# Patient Record
Sex: FEMALE | Race: WHITE | NOT HISPANIC OR LATINO | URBAN - METROPOLITAN AREA
[De-identification: names, ages, dates, MRNs, and addresses within clinical notes are randomized per-mention and may not be internally consistent; named-entity substitution may affect disease eponyms.]

---

## 2018-09-08 ENCOUNTER — EMERGENCY (EMERGENCY)
Facility: HOSPITAL | Age: 48
LOS: 1 days | End: 2018-09-08
Payer: COMMERCIAL

## 2018-09-08 PROCEDURE — 99284 EMERGENCY DEPT VISIT MOD MDM: CPT

## 2018-09-08 PROCEDURE — 71046 X-RAY EXAM CHEST 2 VIEWS: CPT | Mod: 26

## 2020-05-28 PROBLEM — Z00.00 ENCOUNTER FOR PREVENTIVE HEALTH EXAMINATION: Status: ACTIVE | Noted: 2020-05-28

## 2020-05-29 ENCOUNTER — APPOINTMENT (OUTPATIENT)
Dept: ULTRASOUND IMAGING | Facility: CLINIC | Age: 50
End: 2020-05-29
Payer: COMMERCIAL

## 2020-05-29 PROCEDURE — 76700 US EXAM ABDOM COMPLETE: CPT

## 2020-06-03 ENCOUNTER — APPOINTMENT (OUTPATIENT)
Dept: DISASTER EMERGENCY | Facility: CLINIC | Age: 50
End: 2020-06-03

## 2020-06-03 DIAGNOSIS — Z01.818 ENCOUNTER FOR OTHER PREPROCEDURAL EXAMINATION: ICD-10-CM

## 2020-06-04 LAB — SARS-COV-2 N GENE NPH QL NAA+PROBE: NOT DETECTED

## 2020-06-05 ENCOUNTER — OUTPATIENT (OUTPATIENT)
Dept: OUTPATIENT SERVICES | Facility: HOSPITAL | Age: 50
LOS: 1 days | End: 2020-06-05

## 2020-07-22 ENCOUNTER — APPOINTMENT (OUTPATIENT)
Dept: MAMMOGRAPHY | Facility: CLINIC | Age: 50
End: 2020-07-22
Payer: COMMERCIAL

## 2020-07-22 ENCOUNTER — APPOINTMENT (OUTPATIENT)
Dept: ULTRASOUND IMAGING | Facility: CLINIC | Age: 50
End: 2020-07-22
Payer: COMMERCIAL

## 2020-07-22 PROCEDURE — 77063 BREAST TOMOSYNTHESIS BI: CPT

## 2020-07-22 PROCEDURE — 76536 US EXAM OF HEAD AND NECK: CPT

## 2020-07-22 PROCEDURE — 77067 SCR MAMMO BI INCL CAD: CPT

## 2020-07-28 ENCOUNTER — APPOINTMENT (OUTPATIENT)
Dept: ENDOCRINOLOGY | Facility: CLINIC | Age: 50
End: 2020-07-28
Payer: COMMERCIAL

## 2020-07-28 VITALS
HEIGHT: 62 IN | WEIGHT: 137 LBS | HEART RATE: 76 BPM | OXYGEN SATURATION: 98 % | DIASTOLIC BLOOD PRESSURE: 86 MMHG | SYSTOLIC BLOOD PRESSURE: 142 MMHG | BODY MASS INDEX: 25.21 KG/M2

## 2020-07-28 DIAGNOSIS — E04.1 NONTOXIC SINGLE THYROID NODULE: ICD-10-CM

## 2020-07-28 DIAGNOSIS — Z78.9 OTHER SPECIFIED HEALTH STATUS: ICD-10-CM

## 2020-07-28 DIAGNOSIS — Z80.9 FAMILY HISTORY OF MALIGNANT NEOPLASM, UNSPECIFIED: ICD-10-CM

## 2020-07-28 PROCEDURE — 99205 OFFICE O/P NEW HI 60 MIN: CPT

## 2020-07-28 RX ORDER — MULTIVIT-MIN/FOLIC/VIT K/LYCOP 400-300MCG
240 (27 FE) TABLET ORAL
Refills: 0 | Status: ACTIVE | COMMUNITY

## 2020-07-28 NOTE — HISTORY OF PRESENT ILLNESS
[FreeTextEntry1] : Nevin Rosales is a 49 yr old female, who I was asked to see in consultation for evaluation  of thyroid nodule.\par Per patient she has known to have thyroid nodule for more than 10 years, had FNA of right sided nodule in 2010, came back as benign ,consistent with goiter.\par She was then being followed by Dr. Brewer, had ultrasound in [facility] , last one in 2017, we reviewed images on the machine, right sided nodule was about 3.2 cm then.\par \par Then she says she recently had another ultrasound , shows spongiform nodule right side about 3.8 cm.\par No family h/o thyroid disorder or cancer.no h/o neck radiation.\par Intentional weight loss of 13 pounds.\par  No dysphagia, no SOB.Denies heat or cold intolerance,  no constipation or diarrhea, no palpitations, energy level fair, no skin or hair changes.\par \par

## 2020-07-28 NOTE — ASSESSMENT
[FreeTextEntry1] : Nevin Hubbard is a 49 yr old female, who I was asked to see in consultation for evaluation  of thyroid nodule.\par Per patient she has known to have thyroid nodule for more than 10 years, had FNA of right sided nodule in 2010, came back as benign ,consistent with goiter.\par She was then being followed by Dr. Brewer, had ultrasound in [facility] [address] , last one in 2017, we reviewed images on the machine, right sided nodule was about 3.2 cm then.\par Then she says she recently had another ultrasound , shows spongiform nodule right side about 3.8 cm.\par I reviewed images from the previous ultrasound done in 2017 and then recently done on 7/22/2020.\par The right sided nodule has shown some small growth over 3 years, but previous biopsy was benign, and looks like spongiform nodule which are not concerning, so discussed with patient that though it does not look concerning but the size is now getting bigger and if continues to grow we might do another biopsy,. she voiced understanding.\par I have discussed with the patient the incidence of thyroid nodules (fairly high) and the incidence of thyroid cancer (fairly low). We also discussed what can be the worrisome features of malignant nodule and increased incidence of thyroid cancer in patients >60 years old. \par We also  discussed with patient that per [facility]  guidelines  FNA is not recommended in sub centimeter nodules in low risk patient like himself risk of malignancy is very low. Only 1 right sided nodule iks 3.8 cm , another one is small.\par So at this point will repeat ultrasound in 6 month , incase of further growth will repeat FNA then.\par \par RTC in 6 months with ultrasound and thyroid blood work.

## 2020-07-28 NOTE — PHYSICAL EXAM
[Alert] : alert [Well Nourished] : well nourished [No Acute Distress] : no acute distress [Normal Sclera/Conjunctiva] : normal sclera/conjunctiva [No Proptosis] : no proptosis [Supple] : the neck was supple [No Lid Lag] : no lid lag [No Respiratory Distress] : no respiratory distress [Thyroid Not Enlarged] : the thyroid was not enlarged [Clear to Auscultation] : lungs were clear to auscultation bilaterally [No Accessory Muscle Use] : no accessory muscle use [Normal Rate and Effort] : normal respiratory rate and effort [No Murmurs] : no murmurs [Normal Rate] : heart rate was normal [Normal S1, S2] : normal S1 and S2 [Normal Bowel Sounds] : normal bowel sounds [Regular Rhythm] : with a regular rhythm [No Masses] : no abdominal mass palpated [Not Tender] : non-tender [Not Distended] : not distended [Soft] : abdomen soft [Normal Supraclavicular Nodes] : no supraclavicular lymphadenopathy [Normal Anterior Cervical Nodes] : no anterior cervical lymphadenopathy [No Stigmata of Cushings Syndrome] : no stigmata of Cushings Syndrome [Normal Posterior Cervical Nodes] : no posterior cervical lymphadenopathy [Normal Gait] : normal gait [No Clubbing, Cyanosis] : no clubbing  or cyanosis of the fingernails [No Joint Swelling] : no joint swelling seen [No Rash] : no rash [No Motor Deficits] : the motor exam was normal [No Skin Lesions] : no skin lesions [No Tremors] : no tremors [No Sensory Deficits] : the sensory exam was normal to light touch and pinprick [Normal Mood] : the mood was normal [Normal Affect] : the affect was normal [Oriented x3] : oriented to person, place, and time [Acanthosis Nigricans] : no acanthosis nigricans [de-identified] : nodule palpated on right side

## 2020-07-28 NOTE — REVIEW OF SYSTEMS
[Recent Weight Loss (___ Lbs)] : recent weight loss: [unfilled] lbs [Fatigue] : no fatigue [Decreased Appetite] : appetite not decreased [Recent Weight Gain (___ Lbs)] : no recent weight gain [Visual Field Defect] : no visual field defect [Dysphagia] : no dysphagia [Dry Eyes] : no dryness [Eye Pain] : no pain [Neck Pain] : no neck pain [Dysphonia] : no dysphonia [Chest Pain] : no chest pain [Palpitations] : no palpitations [Lower Ext Edema] : no lower extremity edema [Shortness Of Breath] : no shortness of breath [Cough] : no cough [Orthopnea] : no orthopnea [Nausea] : no nausea [Constipation] : no constipation [Vomiting] : no vomiting [Abdominal Pain] : no abdominal pain [Diarrhea] : no diarrhea [Polyuria] : no polyuria [Joint Pain] : no joint pain [Dysuria] : no dysuria [Muscle Weakness] : no muscle weakness [Myalgia] : no myalgia  [Acanthosis] : no acanthosis  [Acne] : no acne [Dry Skin] : no dry skin [Headaches] : no headaches [Dizziness] : no dizziness [Tremors] : no tremors [Depression] : no depression [Insomnia] : no insomnia [Anxiety] : no anxiety [Polydipsia] : no polydipsia [Cold Intolerance] : no cold intolerance [Heat Intolerance] : no heat intolerance [Easy Bleeding] : no ~M tendency for easy bleeding [Easy Bruising] : no tendency for easy bruising [Swelling] : no swelling

## 2020-11-11 ENCOUNTER — APPOINTMENT (OUTPATIENT)
Dept: ULTRASOUND IMAGING | Facility: CLINIC | Age: 50
End: 2020-11-11
Payer: COMMERCIAL

## 2020-11-11 PROCEDURE — 76536 US EXAM OF HEAD AND NECK: CPT

## 2020-12-09 ENCOUNTER — APPOINTMENT (OUTPATIENT)
Dept: ENDOCRINOLOGY | Facility: CLINIC | Age: 50
End: 2020-12-09

## 2022-11-10 ENCOUNTER — APPOINTMENT (OUTPATIENT)
Dept: RADIOLOGY | Facility: CLINIC | Age: 52
End: 2022-11-10

## 2022-11-10 ENCOUNTER — APPOINTMENT (OUTPATIENT)
Dept: MAMMOGRAPHY | Facility: CLINIC | Age: 52
End: 2022-11-10

## 2022-11-10 PROCEDURE — 77067 SCR MAMMO BI INCL CAD: CPT

## 2022-11-10 PROCEDURE — 77063 BREAST TOMOSYNTHESIS BI: CPT

## 2022-11-11 ENCOUNTER — APPOINTMENT (OUTPATIENT)
Dept: ULTRASOUND IMAGING | Facility: CLINIC | Age: 52
End: 2022-11-11

## 2022-11-11 PROCEDURE — 76641 ULTRASOUND BREAST COMPLETE: CPT | Mod: 50

## 2023-06-05 ENCOUNTER — APPOINTMENT (OUTPATIENT)
Dept: ULTRASOUND IMAGING | Facility: CLINIC | Age: 53
End: 2023-06-05
Payer: COMMERCIAL

## 2023-06-05 PROCEDURE — 76536 US EXAM OF HEAD AND NECK: CPT

## 2023-07-19 ENCOUNTER — APPOINTMENT (OUTPATIENT)
Dept: MRI IMAGING | Facility: CLINIC | Age: 53
End: 2023-07-19
Payer: COMMERCIAL

## 2023-07-19 PROCEDURE — 72148 MRI LUMBAR SPINE W/O DYE: CPT

## 2023-07-19 PROCEDURE — 72141 MRI NECK SPINE W/O DYE: CPT

## 2023-07-27 ENCOUNTER — APPOINTMENT (OUTPATIENT)
Dept: MRI IMAGING | Facility: CLINIC | Age: 53
End: 2023-07-27
Payer: COMMERCIAL

## 2023-07-27 PROCEDURE — 70551 MRI BRAIN STEM W/O DYE: CPT

## 2023-10-05 ENCOUNTER — APPOINTMENT (OUTPATIENT)
Dept: ENDOCRINOLOGY | Facility: CLINIC | Age: 53
End: 2023-10-05

## 2024-05-03 ENCOUNTER — RESULT REVIEW (OUTPATIENT)
Age: 54
End: 2024-05-03

## 2024-05-03 ENCOUNTER — APPOINTMENT (OUTPATIENT)
Dept: ULTRASOUND IMAGING | Facility: CLINIC | Age: 54
End: 2024-05-03
Payer: COMMERCIAL

## 2024-05-03 ENCOUNTER — APPOINTMENT (OUTPATIENT)
Dept: MAMMOGRAPHY | Facility: CLINIC | Age: 54
End: 2024-05-03
Payer: COMMERCIAL

## 2024-05-03 PROCEDURE — 77063 BREAST TOMOSYNTHESIS BI: CPT

## 2024-05-03 PROCEDURE — 76641 ULTRASOUND BREAST COMPLETE: CPT | Mod: 50

## 2024-05-03 PROCEDURE — 77067 SCR MAMMO BI INCL CAD: CPT

## 2024-05-22 ENCOUNTER — APPOINTMENT (OUTPATIENT)
Dept: ULTRASOUND IMAGING | Facility: CLINIC | Age: 54
End: 2024-05-22

## 2024-05-22 ENCOUNTER — RESULT REVIEW (OUTPATIENT)
Age: 54
End: 2024-05-22

## 2024-05-22 ENCOUNTER — APPOINTMENT (OUTPATIENT)
Dept: MAMMOGRAPHY | Facility: CLINIC | Age: 54
End: 2024-05-22
Payer: COMMERCIAL

## 2024-05-22 PROCEDURE — 76642 ULTRASOUND BREAST LIMITED: CPT | Mod: RT

## 2024-05-22 PROCEDURE — 77065 DX MAMMO INCL CAD UNI: CPT | Mod: RT

## 2024-05-22 PROCEDURE — G0279: CPT | Mod: RT

## 2024-09-10 ENCOUNTER — APPOINTMENT (OUTPATIENT)
Dept: ULTRASOUND IMAGING | Facility: CLINIC | Age: 54
End: 2024-09-10
Payer: COMMERCIAL

## 2024-09-10 PROCEDURE — 76536 US EXAM OF HEAD AND NECK: CPT
